# Patient Record
Sex: FEMALE | Race: WHITE | NOT HISPANIC OR LATINO | Employment: FULL TIME | URBAN - METROPOLITAN AREA
[De-identification: names, ages, dates, MRNs, and addresses within clinical notes are randomized per-mention and may not be internally consistent; named-entity substitution may affect disease eponyms.]

---

## 2018-10-31 ENCOUNTER — OFFICE VISIT (OUTPATIENT)
Dept: FAMILY MEDICINE CLINIC | Facility: CLINIC | Age: 58
End: 2018-10-31

## 2018-10-31 VITALS
RESPIRATION RATE: 16 BRPM | WEIGHT: 158 LBS | OXYGEN SATURATION: 98 % | BODY MASS INDEX: 26.33 KG/M2 | HEIGHT: 65 IN | TEMPERATURE: 97.5 F | HEART RATE: 77 BPM | DIASTOLIC BLOOD PRESSURE: 82 MMHG | SYSTOLIC BLOOD PRESSURE: 132 MMHG

## 2018-10-31 DIAGNOSIS — K86.89 PANCREATIC MASS: Primary | ICD-10-CM

## 2018-10-31 DIAGNOSIS — R79.89 ELEVATED LFTS: ICD-10-CM

## 2018-10-31 DIAGNOSIS — I10 ESSENTIAL HYPERTENSION: ICD-10-CM

## 2018-10-31 DIAGNOSIS — R17 ELEVATED BILIRUBIN: ICD-10-CM

## 2018-10-31 PROCEDURE — 99214 OFFICE O/P EST MOD 30 MIN: CPT | Performed by: FAMILY MEDICINE

## 2018-10-31 RX ORDER — AMLODIPINE BESYLATE 5 MG/1
5 TABLET ORAL DAILY
Qty: 90 TABLET | Refills: 1 | Status: SHIPPED | OUTPATIENT
Start: 2018-10-31 | End: 2019-10-28 | Stop reason: SDUPTHER

## 2018-10-31 RX ORDER — AMLODIPINE BESYLATE 5 MG/1
5 TABLET ORAL
COMMUNITY
Start: 2018-10-12 | End: 2018-10-31 | Stop reason: SDUPTHER

## 2019-10-28 ENCOUNTER — OFFICE VISIT (OUTPATIENT)
Dept: FAMILY MEDICINE CLINIC | Facility: CLINIC | Age: 59
End: 2019-10-28

## 2019-10-28 VITALS
OXYGEN SATURATION: 98 % | DIASTOLIC BLOOD PRESSURE: 85 MMHG | BODY MASS INDEX: 23.49 KG/M2 | RESPIRATION RATE: 16 BRPM | HEIGHT: 65 IN | HEART RATE: 76 BPM | SYSTOLIC BLOOD PRESSURE: 122 MMHG | TEMPERATURE: 99.6 F | WEIGHT: 141 LBS

## 2019-10-28 DIAGNOSIS — C25.9 MALIGNANT NEOPLASM OF PANCREAS, UNSPECIFIED LOCATION OF MALIGNANCY (HCC): Primary | ICD-10-CM

## 2019-10-28 DIAGNOSIS — I10 ESSENTIAL HYPERTENSION: ICD-10-CM

## 2019-10-28 PROBLEM — K86.89 PANCREATIC MASS: Status: ACTIVE | Noted: 2018-10-07

## 2019-10-28 PROBLEM — R78.81 BACTEREMIA: Status: ACTIVE | Noted: 2019-06-26

## 2019-10-28 PROBLEM — K56.7 ILEUS (HCC): Status: ACTIVE | Noted: 2019-05-21

## 2019-10-28 PROBLEM — R74.8 ELEVATED LIVER ENZYMES: Status: ACTIVE | Noted: 2018-10-08

## 2019-10-28 PROBLEM — E86.0 LUETSCHER'S SYNDROME: Status: ACTIVE | Noted: 2019-06-24

## 2019-10-28 PROBLEM — F32.1 CURRENT MODERATE EPISODE OF MAJOR DEPRESSIVE DISORDER WITHOUT PRIOR EPISODE (HCC): Status: ACTIVE | Noted: 2019-07-19

## 2019-10-28 PROBLEM — A41.9 SEPSIS (HCC): Status: ACTIVE | Noted: 2019-06-28

## 2019-10-28 PROBLEM — F43.22 ADJUSTMENT DISORDER WITH ANXIOUS MOOD: Status: ACTIVE | Noted: 2018-10-10

## 2019-10-28 PROBLEM — K94.13 JEJUNOSTOMY TUBE LEAK (HCC): Status: ACTIVE | Noted: 2019-07-17

## 2019-10-28 PROCEDURE — 99213 OFFICE O/P EST LOW 20 MIN: CPT | Performed by: FAMILY MEDICINE

## 2019-10-28 RX ORDER — CYANOCOBALAMIN 1000 UG/ML
INJECTION, SOLUTION INTRAMUSCULAR; SUBCUTANEOUS
Refills: 2 | COMMUNITY
Start: 2019-10-23

## 2019-10-28 RX ORDER — DIPHENOXYLATE HYDROCHLORIDE AND ATROPINE SULFATE 2.5; .025 MG/1; MG/1
1 TABLET ORAL
COMMUNITY
Start: 2019-08-28

## 2019-10-28 RX ORDER — GRANISETRON 3.1 MG/24H
PATCH TRANSDERMAL
Refills: 6 | COMMUNITY
Start: 2019-10-17

## 2019-10-28 RX ORDER — IRON PS COMPLEX/B12/FOLIC ACID 150-25-1
1 CAPSULE ORAL DAILY
Refills: 2 | COMMUNITY
Start: 2019-10-24

## 2019-10-28 RX ORDER — LANCETS
1 EACH MISCELLANEOUS DAILY
COMMUNITY
End: 2019-10-28 | Stop reason: SDUPTHER

## 2019-10-28 RX ORDER — INSULIN GLARGINE 100 [IU]/ML
INJECTION, SOLUTION SUBCUTANEOUS
Refills: 0 | COMMUNITY
Start: 2019-08-09

## 2019-10-28 RX ORDER — CITALOPRAM 40 MG/1
40 TABLET ORAL DAILY
COMMUNITY
Start: 2019-10-04

## 2019-10-28 RX ORDER — LANCETS
1 EACH MISCELLANEOUS DAILY
Qty: 100 EACH | Refills: 3 | Status: SHIPPED | OUTPATIENT
Start: 2019-10-28

## 2019-10-28 RX ORDER — BUPROPION HYDROCHLORIDE 150 MG/1
150 TABLET ORAL DAILY
COMMUNITY
Start: 2019-10-04

## 2019-10-28 RX ORDER — LANCETS 28 GAUGE
EACH MISCELLANEOUS
COMMUNITY
Start: 2019-10-03 | End: 2019-10-28 | Stop reason: SDUPTHER

## 2019-10-28 RX ORDER — AMLODIPINE BESYLATE 5 MG/1
5 TABLET ORAL DAILY
Qty: 90 TABLET | Refills: 1 | Status: SHIPPED | OUTPATIENT
Start: 2019-10-28

## 2019-10-28 RX ORDER — METOCLOPRAMIDE 10 MG/1
10 TABLET ORAL EVERY 6 HOURS PRN
Refills: 0 | COMMUNITY
Start: 2019-10-23

## 2019-10-28 NOTE — PROGRESS NOTES
Subjective   Chief Complaint:   Chief Complaint   Patient presents with   • Diabetes         History of Present Illness has a diagnosis of pancreatic cancer about a year ago she seen in some [facility] surgeons and GI doctors.  She comes in today for me to refill her amlodipine and to give her some test strips and lancets for diabetes she is on Lantus insulin she takes 8 units a day.  Was diagnosed with pancreatic cancer a year ago she has had surgery and she has a GI tube and she gives her food intake through that tube.  SHE  is on 8 units of Lantus daily.    Checks her blood sugars once a day.  Have blood sugars when she checks her sugars are  110-170.  So I would like to get a consult with Dr. STEWART    for a diabetes consult.  Will ask her GI doctor or specialist first before she contacts [facility].            Francesca FERNANDEZ Marck 59 y.o. female who presents today for Medical Management of the below listed issues and medication refills.    ICD-10-CM ICD-9-CM   1. Malignant neoplasm of pancreas, unspecified location of malignancy (CMS/HCC) C25.9 157.9   2. Essential hypertension I10 401.9        she has a problem list of   Patient Active Problem List   Diagnosis   • Essential hypertension   • Adjustment disorder with anxious mood   • Bacteremia   • Current moderate episode of major depressive disorder without prior episode (CMS/HCC)   • Elevated liver enzymes   • Ileus (CMS/HCC)   • Jejunostomy tube leak (CMS/HCC)   • Luetscher's syndrome   • Malignant neoplasm of pancreas (CMS/HCC)   • Pancreatic mass   • Sepsis (CMS/HCC)   • Uncontrolled hypertension   .    she has been compliant with   Current Outpatient Medications:   •  ACCU-CHEK SOFTCLIX LANCETS lancets, 1 each by Other route Daily. Check Blood Sugar once daily.  E11.9, Disp: 100 each, Rfl: 3  •  amLODIPine (NORVASC) 5 MG tablet, Take 1 tablet by mouth Daily., Disp: 90 tablet, Rfl: 1  •  buPROPion XL (WELLBUTRIN XL) 150 MG 24 hr tablet, Take 150 mg by mouth  "Daily., Disp: , Rfl:   •  citalopram (CeleXA) 40 MG tablet, Take 40 mg by mouth Daily., Disp: , Rfl:   •  cyanocobalamin 1000 MCG/ML injection, INJECT 1 ML SUBCUTANEOUSLY ONCE A MONTH, Disp: , Rfl: 2  •  diphenoxylate-atropine (LOMOTIL) 2.5-0.025 MG per tablet, Take 1 tablet by mouth., Disp: , Rfl:   •  glucose blood test strip, 1 each by Other route Daily. Accu Chek Nicole Plus Strips.  Use to check blood sugar once daily.  E11.9, Disp: 100 each, Rfl: 3  •  granisetron (SANCUSO) 3.1 MG/24HR, Place 1 patch on the skin as directed by provider., Disp: , Rfl:   •  Insulin Glargine (LANTUS SOLOSTAR) 100 UNIT/ML injection pen, Inject 8 Units under the skin into the appropriate area as directed Daily., Disp: , Rfl:   •  Insulin Pen Needle 31G X 5 MM misc, Inject 1 each under the skin into the appropriate area as directed Daily., Disp: , Rfl:   •  LANTUS SOLOSTAR 100 UNIT/ML injection pen, INJECT 8 UNITS SUBCUTANEOUSLY ONCE DAILY, Disp: , Rfl: 0  •  metoclopramide (REGLAN) 10 MG tablet, Take 10 mg by mouth Every 6 (Six) Hours As Needed., Disp: , Rfl: 0  •  POLY-IRON 150 FORTE 150-25-1 MG-MCG-MG capsule capsule, Take 1 capsule by mouth Daily., Disp: , Rfl: 2  •  SANCUSO 3.1 MG/24HR, JENNIFER 1 PATCH UTD ONCE WEEKLY, Disp: , Rfl: 6.  she denies medication side effects.        /85   Pulse 76   Temp 99.6 °F (37.6 °C)   Resp 16   Ht 165.1 cm (65\")   Wt 64 kg (141 lb)   SpO2 98%   BMI 23.46 kg/m²     No results found for this or any previous visit.          The following portions of the patient's history were reviewed and updated as appropriate: allergies, current medications, past family history, past medical history, past social history, past surgical history and problem list.    Review of Systems   Constitutional: Negative for activity change, appetite change and unexpected weight change.   HENT: Negative for sinus pressure and sore throat.    Eyes: Negative for pain and visual disturbance.   Respiratory: Negative for " chest tightness and shortness of breath.    Cardiovascular: Negative for chest pain and palpitations.   Gastrointestinal: Positive for nausea. Negative for abdominal pain.   Genitourinary: Negative for dysuria and frequency.   Musculoskeletal: Negative for back pain.   Skin: Negative for color change and rash.   Neurological: Negative for syncope and speech difficulty.   Psychiatric/Behavioral: Negative for confusion and decreased concentration.       Objective   Physical Exam   Constitutional: She is oriented to person, place, and time. She appears well-developed and well-nourished.   HENT:   Mouth/Throat: Oropharynx is clear and moist.   Eyes: Pupils are equal, round, and reactive to light.   Neck: No thyromegaly present.   Cardiovascular: Normal rate and regular rhythm.   Pulmonary/Chest: Effort normal and breath sounds normal.   Abdominal: Soft. Bowel sounds are normal.   Is a G-tube in her stomach.   Musculoskeletal: She exhibits no edema.   Lymphadenopathy:     She has no cervical adenopathy.   Neurological: She is alert and oriented to person, place, and time.   Psychiatric: She has a normal mood and affect. Her behavior is normal.   Nursing note and vitals reviewed.      Assessment/Plan   Francesca was seen today for diabetes.    Diagnoses and all orders for this visit:    Malignant neoplasm of pancreas, unspecified location of malignancy (CMS/HCC)    Essential hypertension    Other orders  -     amLODIPine (NORVASC) 5 MG tablet; Take 1 tablet by mouth Daily.  -     ACCU-CHEK SOFTCLIX LANCETS lancets; 1 each by Other route Daily. Check Blood Sugar once daily.  E11.9  -     glucose blood test strip; 1 each by Other route Daily. Accu Chek Nicole Plus Strips.  Use to check blood sugar once daily.  E11.9                 -Labs results discussed in detail with the patient, if no recent labs were done, order placed today.  Plan to update vaccines if needed today.  I  reviewed health maintenance with the patient as part  of my preventative care plan. I discussed preventative counseling with the patient in detail.